# Patient Record
(demographics unavailable — no encounter records)

---

## 2025-05-12 NOTE — ASSESSMENT
[FreeTextEntry1] : Reviewed and reconciled medications, allergies, PMHx, PSHx, SocHx, FMHx.  Patient presents today stating that he has PND and his hearing may have gotten worse. He denies tinnitus. He denies dizziness. He states that he was exposed to loud noise working as a , but he retired in 2001. He states that he clears his throat frequently. He states that he snores at night occasionally without witnessed pauses in his breathing. He denies feeling tired in the morning. He states that his father had a polyp in his throat.  Physical exam: -left ear canal: minimal cerumen removed via curettage -no lateralization to tuning forks -deviated septum right with obstruction -mildly inflamed turbinates bilaterally -negative luis angel maneuver -tonsils removed -mild edema of the uvula -type 3 oral cavity  ENT Procedure: Flexible Nasal Endoscopy with a Look at the Larynx -deviated septum bilaterally with narrowing on the right -middle meatus normal bilaterally -no polyps or growths bilaterally -lymphoid tissue with mucus pooling in the nasopharynx -edema of the postcricoid, arytenoids and interarytenoids -vocal cords appear normal -pooling in the vallecular -symmetric lymphoid tissue at the BOT  Audio 5/12/25: -Type A Tymp AU -ETF WNL AU -AD: Hearing -41338 Hz mild-moderate SNHL 0442-6282 Hz to WNL @ 8000 Hz -AS: Hearing -1000 Hz essentially mild-moderate SNHL 7205-3643 Hz -96% discrimination at 50 dB AD and at 55 dB AS -right TPP: -5 -left TPP: -72  Plan: Audio - results interpreted by Dr. Nichole and reviewed with the patient. -Start Atrovent - 1 or 2 sprays bilaterally up to QID, spray laterally -Ordered CT Sinuses and CT Neck Soft Tissue for potential surgical planning -FU with results from CT scans

## 2025-05-12 NOTE — DATA REVIEWED
[de-identified] : Audio 5/12/25: -Type A Tymp AU -ETF WNL AU -AD: Hearing -30712 Hz mild-moderate SNHL 3856-0738 Hz to WNL @ 8000 Hz -AS: Hearing -1000 Hz essentially mild-moderate SNHL 5508-1952 Hz -96% discrimination at 50 dB AD and at 55 dB AS -right TPP: -5 -left TPP: -72

## 2025-05-12 NOTE — REVIEW OF SYSTEMS
[Post Nasal Drip] : post nasal drip [Hearing Loss] : hearing loss [Nasal Congestion] : nasal congestion [Nose Bleeds] : nose bleeds [Problem Snoring] : problem snoring [Discolored Nasal Discharge] : discolored nasal discharge [Snoring With Pauses] : snoring with pauses [Hoarseness] : hoarseness [Heartburn] : heartburn [Negative] : Heme/Lymph [FreeTextEntry7] : reflux

## 2025-05-12 NOTE — HISTORY OF PRESENT ILLNESS
[de-identified] : Patient presents today stating that he has PND and his hearing may have gotten worse. He denies tinnitus. He denies dizziness. He states that he was exposed to loud noise working as a , but he retired in 2001. He states that he clears his throat frequently. He states that he snores at night occasionally without witnessed pauses in his breathing. He denies feeling tired in the morning. He states that his father had a polyp in his throat.

## 2025-05-12 NOTE — PROCEDURE
[Recalcitrant Symptoms] : recalcitrant symptoms  [Flexible Endoscope] : examined with the flexible endoscope [de-identified] : Dr. Nichole [de-identified] : PND [FreeTextEntry6] :  Procedure: Flexible Nasal Endoscopy with a Look at the Larynx: Risks, benefits, and alternatives of flexible endoscopy were explained to the patient. The patient gave oral consent to proceed. The flexible scope was inserted into the right nasal cavity. Endoscopy of the inferior and middle meatus was performed. No polyp, mass, or lesion was appreciated. Olfactory cleft was clear. Spheno-ethmoid recess clear. Nasopharynx was with lymphoid tissue with mucus pooling. Turbinates were without mass. There was a deviated septum bilaterally with narrowing on the right. Oropharyngeal walls were symmetric and mobile without lesion, mass, or edema. Hypopharynx was also without lesion or edema. Larynx was mobile without lesions. Supraglottic structures were free of mass and asymmetry, but edema of the postcricoid, arytenoids and interarytenoids. True vocal folds were white without mass or lesion. Base of tongue was with symmetric lymphoid tissue. There was pooling in the vallecular. -deviated septum bilaterally with narrowing on the right -middle meatus normal bilaterally -no polyps or growths bilaterally -lymphoid tissue with mucus pooling in the nasopharynx -edema of the postcricoid, arytenoids and interarytenoids -vocal cords appear normal -pooling in the vallecular -symmetric lymphoid tissue at the BOT

## 2025-05-12 NOTE — CONSULT LETTER
[Dear  ___] : Dear  [unfilled], [Consult Letter:] : I had the pleasure of evaluating your patient, [unfilled]. [Please see my note below.] : Please see my note below. [Consult Closing:] : Thank you very much for allowing me to participate in the care of this patient.  If you have any questions, please do not hesitate to contact me. [Sincerely,] : Sincerely, [FreeTextEntry3] :  Jared Nichole MD FACS

## 2025-05-12 NOTE — ADDENDUM
[FreeTextEntry1] :  Documented by Jesus Watts acting as scribe for Dr. Nichole on 05/12/2025. All Medical record entries made by the Scribe were at my, Dr. Nichole, direction and personally dictated by me on 05/12/2025 . I have reviewed the chart and agree that the record accurately reflects my personal performance of the history, physical exam, assessment and plan. I have also personally directed, reviewed, and agreed with the discharge instructions.

## 2025-05-12 NOTE — PHYSICAL EXAM
[Hearing Boyd Test (Tuning Fork On Forehead)] : no lateralization of tone [] : septum deviated to the right [Midline] : trachea located in midline position [Removed] : palatine tonsils previously removed [Normal] : no rashes [Hearing Loss Right Only] : normal [Hearing Loss Left Only] : normal [FreeTextEntry9] : minimal cerumen removed via curettage [de-identified] : negative luis angel maneuver [de-identified] : mildly inflamed turbinates bilaterally [de-identified] : mild edema of the uvula. type 3 oral cavity [FreeTextEntry2] : sinuses nontender to percussion. sensations intact [de-identified] : pupils are equal and reactive

## 2025-06-27 NOTE — ASSESSMENT
[FreeTextEntry1] : Reviewed and reconciled medications, allergies, PMHx, PSHx, SocHx, FMHx.  Patient with h/o asymmetrical SNHL, chronic throat clearing, laryngeal edema, PND, chronic nasopharyngitis, and chronic rhinitis presents today for a follow up. He states that he had a tooth extracted recently. He denies facial pain or pressure. He denies getting recurrent sinus infections.  CT Sinuses 5/23/25: -nasal septal perforation 0.3 cm -S-shaped deviated septum -right frontal sinus with thickening inferiorly with obstructed drain pathway -scattered mucosal in the ethmoid sinuses -opacification of a right anterior ethmoid cell -thickening in the sphenoid sinuses with blocked drain pathways -minimal thickening in the maxillary sinuses with narrowed drain pathways -dental disease in the right 3rd molar -abscess around the 1st left maxillary molar teeth  CT Neck Soft Tissue 5/23/25: -no significant lymph nodes -calcification in the left lingual tonsil -asymmetric tonsil tissue in the left vallecular -salivary glands normal  Physical exam: -longitudinal nasal septal perforation -BOT feels normal  Plan: CT scans reviewed and discussed with the patient. -FU in 6 months

## 2025-06-27 NOTE — CONSULT LETTER
[Dear  ___] : Dear  [unfilled], [Courtesy Letter:] : I had the pleasure of seeing your patient, [unfilled], in my office today. [Please see my note below.] : Please see my note below. [Consult Closing:] : Thank you very much for allowing me to participate in the care of this patient.  If you have any questions, please do not hesitate to contact me. [Sincerely,] : Sincerely, [FreeTextEntry3] :  Jared Nichole MD FACS

## 2025-06-27 NOTE — PHYSICAL EXAM
[Normal] : mucosa is normal [Midline] : trachea located in midline position [de-identified] : longitudinal nasal septal perforation [de-identified] : BOT feels normal

## 2025-06-27 NOTE — ADDENDUM
[FreeTextEntry1] :  Documented by Jesus Watts acting as scribe for Dr. Nichole on 06/27/2025. All Medical record entries made by the Scribe were at my, Dr. Nichole, direction and personally dictated by me on 06/27/2025 . I have reviewed the chart and agree that the record accurately reflects my personal performance of the history, physical exam, assessment and plan. I have also personally directed, reviewed, and agreed with the discharge instructions.

## 2025-06-27 NOTE — HISTORY OF PRESENT ILLNESS
[de-identified] : Patient with h/o asymmetrical SNHL, chronic throat clearing, laryngeal edema, PND, chronic nasopharyngitis, and chronic rhinitis presents today for a follow up. He states that he had a tooth extracted recently. He denies facial pain or pressure. He denies getting recurrent sinus infections.

## 2025-06-27 NOTE — DATA REVIEWED
[de-identified] : CT Sinuses 5/23/25: -nasal septal perforation 0.3 cm -S-shaped deviated septum -right frontal sinus with thickening inferiorly with obstructed drain pathway -scattered mucosal in the ethmoid sinuses -opacification of a right anterior ethmoid cell -thickening in the sphenoid sinuses with blocked drain pathways -minimal thickening in the maxillary sinuses with narrowed drain pathways -dental disease in the right 3rd molar -abscess around the 1st left maxillary molar teeth  CT Neck Soft Tissue 5/23/25: -no significant lymph nodes -calcification in the left lingual tonsil -asymmetric tonsil tissue in the left vallecular -salivary glands normal